# Patient Record
Sex: MALE | Race: WHITE | URBAN - METROPOLITAN AREA
[De-identification: names, ages, dates, MRNs, and addresses within clinical notes are randomized per-mention and may not be internally consistent; named-entity substitution may affect disease eponyms.]

---

## 2020-02-10 ENCOUNTER — OFFICE VISIT (OUTPATIENT)
Dept: HEMATOLOGY | Age: 35
End: 2020-02-10

## 2020-02-10 ENCOUNTER — HOSPITAL ENCOUNTER (OUTPATIENT)
Dept: LAB | Age: 35
Discharge: HOME | End: 2020-02-10
Payer: COMMERCIAL

## 2020-02-10 VITALS
TEMPERATURE: 98.3 F | HEART RATE: 81 BPM | HEIGHT: 70 IN | OXYGEN SATURATION: 98 % | BODY MASS INDEX: 39.6 KG/M2 | DIASTOLIC BLOOD PRESSURE: 74 MMHG | RESPIRATION RATE: 18 BRPM | SYSTOLIC BLOOD PRESSURE: 127 MMHG | WEIGHT: 276.6 LBS

## 2020-02-10 DIAGNOSIS — K76.0 FATTY LIVER: Primary | ICD-10-CM

## 2020-02-10 DIAGNOSIS — K76.0 FATTY LIVER: ICD-10-CM

## 2020-02-10 PROBLEM — I10 HYPERTENSION: Status: ACTIVE | Noted: 2020-02-10

## 2020-02-10 LAB
ALBUMIN SERPL-MCNC: 4.3 G/DL (ref 3.4–5)
ALBUMIN/GLOB SERPL: 1.3 {RATIO} (ref 0.8–1.7)
ALP SERPL-CCNC: 65 U/L (ref 45–117)
ALT SERPL-CCNC: 65 U/L (ref 16–61)
ANION GAP SERPL CALC-SCNC: 6 MMOL/L (ref 3–18)
AST SERPL-CCNC: 20 U/L (ref 10–38)
BASOPHILS # BLD: 0 K/UL (ref 0–0.1)
BASOPHILS NFR BLD: 1 % (ref 0–2)
BILIRUB DIRECT SERPL-MCNC: 0.2 MG/DL (ref 0–0.2)
BILIRUB SERPL-MCNC: 0.7 MG/DL (ref 0.2–1)
BUN SERPL-MCNC: 19 MG/DL (ref 7–18)
BUN/CREAT SERPL: 24 (ref 12–20)
CALCIUM SERPL-MCNC: 9.1 MG/DL (ref 8.5–10.1)
CHLORIDE SERPL-SCNC: 102 MMOL/L (ref 100–111)
CO2 SERPL-SCNC: 28 MMOL/L (ref 21–32)
CREAT SERPL-MCNC: 0.79 MG/DL (ref 0.6–1.3)
DIFFERENTIAL METHOD BLD: ABNORMAL
EOSINOPHIL # BLD: 0.2 K/UL (ref 0–0.4)
EOSINOPHIL NFR BLD: 3 % (ref 0–5)
ERYTHROCYTE [DISTWIDTH] IN BLOOD BY AUTOMATED COUNT: 13.1 % (ref 11.6–14.5)
FERRITIN SERPL-MCNC: 241 NG/ML (ref 8–388)
GLOBULIN SER CALC-MCNC: 3.4 G/DL (ref 2–4)
GLUCOSE SERPL-MCNC: 93 MG/DL (ref 74–99)
HCT VFR BLD AUTO: 45 % (ref 36–48)
HGB BLD-MCNC: 15.1 G/DL (ref 13–16)
INR PPP: 1 (ref 0.8–1.2)
IRON SATN MFR SERPL: 27 % (ref 20–50)
IRON SERPL-MCNC: 92 UG/DL (ref 50–175)
LYMPHOCYTES # BLD: 2.3 K/UL (ref 0.9–3.6)
LYMPHOCYTES NFR BLD: 37 % (ref 21–52)
MCH RBC QN AUTO: 28.9 PG (ref 24–34)
MCHC RBC AUTO-ENTMCNC: 33.6 G/DL (ref 31–37)
MCV RBC AUTO: 86.2 FL (ref 74–97)
MONOCYTES # BLD: 0.5 K/UL (ref 0.05–1.2)
MONOCYTES NFR BLD: 8 % (ref 3–10)
NEUTS SEG # BLD: 3.2 K/UL (ref 1.8–8)
NEUTS SEG NFR BLD: 51 % (ref 40–73)
PLATELET # BLD AUTO: 427 K/UL (ref 135–420)
PMV BLD AUTO: 9.7 FL (ref 9.2–11.8)
POTASSIUM SERPL-SCNC: 4.1 MMOL/L (ref 3.5–5.5)
PROT SERPL-MCNC: 7.7 G/DL (ref 6.4–8.2)
PROTHROMBIN TIME: 13.2 SEC (ref 11.5–15.2)
RBC # BLD AUTO: 5.22 M/UL (ref 4.7–5.5)
SODIUM SERPL-SCNC: 136 MMOL/L (ref 136–145)
TIBC SERPL-MCNC: 347 UG/DL (ref 250–450)
WBC # BLD AUTO: 6.3 K/UL (ref 4.6–13.2)

## 2020-02-10 PROCEDURE — 82728 ASSAY OF FERRITIN: CPT

## 2020-02-10 PROCEDURE — 80076 HEPATIC FUNCTION PANEL: CPT

## 2020-02-10 PROCEDURE — 85610 PROTHROMBIN TIME: CPT

## 2020-02-10 PROCEDURE — 86803 HEPATITIS C AB TEST: CPT

## 2020-02-10 PROCEDURE — 85025 COMPLETE CBC W/AUTO DIFF WBC: CPT

## 2020-02-10 PROCEDURE — 80048 BASIC METABOLIC PNL TOTAL CA: CPT

## 2020-02-10 PROCEDURE — 36415 COLL VENOUS BLD VENIPUNCTURE: CPT

## 2020-02-10 PROCEDURE — 86704 HEP B CORE ANTIBODY TOTAL: CPT

## 2020-02-10 PROCEDURE — 86708 HEPATITIS A ANTIBODY: CPT

## 2020-02-10 PROCEDURE — 83540 ASSAY OF IRON: CPT

## 2020-02-10 PROCEDURE — 86706 HEP B SURFACE ANTIBODY: CPT

## 2020-02-10 PROCEDURE — 82390 ASSAY OF CERULOPLASMIN: CPT

## 2020-02-10 PROCEDURE — 82103 ALPHA-1-ANTITRYPSIN TOTAL: CPT

## 2020-02-10 PROCEDURE — 87340 HEPATITIS B SURFACE AG IA: CPT

## 2020-02-10 NOTE — PROGRESS NOTES
Chief Complaint   Patient presents with    New Patient   Patient states he is on two blood pressure pills. He is unsure of names. Will bring both bottles at next appointment. Visit Vitals  /74 (BP 1 Location: Left arm, BP Patient Position: Sitting)   Pulse 81   Temp 98.3 °F (36.8 °C) (Tympanic)   Resp 18   Ht 5' 10\" (1.778 m)   Wt 276 lb 9.6 oz (125.5 kg)   SpO2 98%   BMI 39.69 kg/m²     3 most recent PHQ Screens 2/10/2020   Little interest or pleasure in doing things Not at all   Feeling down, depressed, irritable, or hopeless Several days   Total Score PHQ 2 1     Abuse Screening Questionnaire 2/10/2020   Do you ever feel afraid of your partner? N   Are you in a relationship with someone who physically or mentally threatens you? N   Is it safe for you to go home?  Y     Learning Assessment 2/10/2020   PRIMARY LEARNER Patient   HIGHEST LEVEL OF EDUCATION - PRIMARY LEARNER  2 YEARS OF COLLEGE   BARRIERS PRIMARY LEARNER NONE   PRIMARY LANGUAGE ENGLISH   LEARNER PREFERENCE PRIMARY LISTENING   ANSWERED BY patient   RELATIONSHIP SELF

## 2020-02-10 NOTE — Clinical Note
2/29/20 Patient: Melanie Wade YOB: 1985 Date of Visit: 2/10/2020 Emile Weller MD 
[facility] [address] [facility] [address] [address] VIA Facsimile: [phone] Dear Emile Weller MD, Thank you for referring Mr. Melanie Wade to [address] [place] [address],[address] for evaluation. My notes for this consultation are attached. If you have questions, please do not hesitate to call me. I look forward to following your patient along with you. Sincerely, Jason Bashir MD

## 2020-02-10 NOTE — PROGRESS NOTES
[facility] hospitals, Opal NAIDU Woodfin Grumbling, MD Lauretha Head, MYLA Martínez Officer, [facility]     Jill Mohan, [facility]   Sara Figueroa, YISEL Camargo, [facility]       Mimi Sy Jairo De [address]    at [address], [address] Cec[place] Lyn  22.    [phone]    FAX: [address] [place] [address] Avenue    [address], [address]    [phone]    FAX: [phone]       Patient Care Team:  Wilberto Gonzalez MD as PCP - General (Internal Medicine)      Problem List  Date Reviewed: 2/10/2020          Codes Class Noted    Fatty liver ICD-10-CM: K76.0  ICD-9-CM: 571.8  2/10/2020        Hypertension ICD-10-CM: I10  ICD-9-CM: 401.9  2/10/2020              The clinicians listed above have asked me to see Kaden Gonzáles in consultation regarding elevated liver enzymes and its management. All medical records sent by the referring physicians were reviewed including imaging studies     The patient is a 29 y.o.  male who was found to have elevated liver enzymes in 2013. The most recent imaging of the liver was Ultrasound performed in 11/2019. Results suggest fatty liver disease. Serologic evaluation for markers of chronic liver disease has either not been performed or the results are not available. An assessment of liver fibrosis with biopsy or elastography has not been performed. The patient has no symptoms which can be attributed to the liver disorder. The patient is not currently experiencing the following symptoms of liver disease:  fatigue, pain in the right side over the liver,     The patient completes all daily activities without any functional limitations.       ASSESSMENT AND PLAN:  Elevated liver enzymes  AST is normal.  ALT is elevated. ALP is normal.  Liver function is normal.  The platelet count is normal.      Based upon laboratory studies and imaging the patient does not appear to have advanced liver disease. Serologic testing for causes of chronic liver disease were ordered. All were negative     The most likely causes for the liver chemistry abnormalities were discussed with the patient and include fatty liver disease,     The need to perform an assessment of liver fibrosis was discussed with the patient. The Fibroscan can assess liver fibrosis and determine if a patient has advanced fibrosis or cirrhosis without the need for liver biopsy. This will be performed at the next office visit. If the Fibroscan suggests advanced fibrosis then a liver biopsy should be considered. The Fibroscan can be repeated annually or as often as clinically indicated to assess for fibrosis progression and/or regression. Screening for Hepatocellular Carcinoma  HCC screening is not necessary if the patient has no evidence of cirrhosis. Treatment of other medical problems in patients with chronic liver disease  There are no contraindications for the patient to take most medications that are necessary for treatment of other medical issues. Counseling for alcohol in patients with chronic liver disease  The patient was counseled regarding alcohol consumption and the effect of alcohol on chronic liver disease. The patient does not consume any significant amount of alcohol. Vaccinations   Vaccination for viral hepatitis A and B is recommended since the patient has no serologic evidence of previous exposure or vaccination with immunity. Routine vaccinations against other bacterial and viral agents can be performed as indicated. Annual flu vaccination should be administered if indicated.       ALLERGIES  Allergies   Allergen Reactions    Codeine Rash     childhood       MEDICATIONS  No current outpatient medications on file. No current facility-administered medications for this visit. SYSTEM REVIEW NOT RELATED TO LIVER DISEASE OR REVIEWED ABOVE:  Constitution systems: Negative for fever, chills, weight gain, weight loss. Eyes: Negative for visual changes. ENT: Negative for sore throat, painful swallowing. Respiratory: Negative for cough, hemoptysis, SOB. Cardiology: Negative for chest pain, palpitations. GI:  Negative for constipation or diarrhea. : Negative for urinary frequency, dysuria, hematuria, nocturia. Skin: Negative for rash. Hematology: Negative for easy bruising, blood clots. Musculo-skelatal: Negative for back pain, muscle pain, weakness. Neurologic: Negative for headaches, dizziness, vertigo, memory problems not related to HE. Psychology: Negative for anxiety, depression. FAMILY HISTORY:  The father Has/had the following following chronic disease(s): None. The mother  of cancer. There is no family history of liver disease. SOCIAL HISTORY:  The patient has never been . The patient has no children. The patient has never used tobacco products. The patient has never consumed significant amounts of alcohol. The patient currently works full time in  [employer]. PHYSICAL EXAMINATION:  Visit Vitals  /74 (BP 1 Location: Left arm, BP Patient Position: Sitting)   Pulse 81   Temp 98.3 °F (36.8 °C) (Tympanic)   Resp 18   Ht 5' 10\" (1.778 m)   Wt 276 lb 9.6 oz (125.5 kg)   SpO2 98%   BMI 39.69 kg/m²     General: No acute distress. Eyes: Sclera anicteric. ENT: No oral lesions. Thyroid normal.  Nodes: No adenopathy. Skin: No spider angiomata. No jaundice. No palmar erythema. Respiratory: Lungs clear to auscultation. Cardiovascular: Regular heart rate. No murmurs. No JVD. Abdomen: Soft non-tender. Liver size normal to percussion/palpation. Spleen not palpable. No obvious ascites. Extremities: No edema.   No muscle wasting. No gross arthritic changes. Neurologic: Alert and oriented. Cranial nerves grossly intact. No asterixis. LABORATORY STUDIES:  Liver [place] of [address] [place] [address] Units 2/10/2020   WBC 4.6 - 13.2 K/uL 6.3   ANC 1.8 - 8.0 K/UL 3.2   HGB 13.0 - 16.0 g/dL 15.1    - 420 K/uL 427 (H)   INR 0.8 - 1.2   1.0   AST 10 - 38 U/L 20   ALT 16 - 61 U/L 65 (H)   Alk Phos 45 - 117 U/L 65   Bili, Total 0.2 - 1.0 MG/DL 0.7   Bili, Direct 0.0 - 0.2 MG/DL 0.2   Albumin 3.4 - 5.0 g/dL 4.3   BUN 7.0 - 18 MG/DL 19 (H)   Creat 0.6 - 1.3 MG/DL 0.79   Na 136 - 145 mmol/L 136   K 3.5 - 5.5 mmol/L 4.1   Cl 100 - 111 mmol/L 102   CO2 21 - 32 mmol/L 28   Glucose 74 - 99 mg/dL 93     SEROLOGIES:  Serologies Latest Ref Rng & Units 2/10/2020   Hep A Ab, Total NEGATIVE   NEGATIVE   Hep B Surface Ag <1.00 Index <0.10   Hep B Surface Ag Interp NEG   NEGATIVE   Hep B Core Ab, Total NEGATIVE   NEGATIVE   Hep B Surface Ab >10.0 mIU/mL <3.10 (L)   Hep B Surface Ab Interp POS   NEGATIVE (A)   Hep C Ab 0.0 - 0.9 s/co ratio 0.3   Ferritin 8 - 388 NG/   Iron % Saturation 20 - 50 % 27   Ceruloplasmin 16.0 - 31.0 mg/dL 28.3   Alpha-1 antitrypsin level 95 - 164 mg/dL 122     LIVER HISTOLOGY:  Not available or performed    ENDOSCOPIC PROCEDURES:  Not available or performed    RADIOLOGY:  11/2019. Ultrasound of liver. Echogenic consistent with fatty liver. No liver mass lesions. No dilated bile ducts. No ascites. OTHER TESTING:  Not available or performed    FOLLOW-UP:  All of the issues listed above in the Assessment and Plan were discussed with the patient. All questions were answered. The patient expressed a clear understanding of the above. [address] [place] [address] in 4 weeks for Fibroscan to review all data and determine the treatment plan.       Federico Thapa MD  [address] [facility]  [address] [place] [address] [address] [facility]

## 2020-02-11 LAB
A1AT SERPL-MCNC: 122 MG/DL (ref 95–164)
CERULOPLASMIN SERPL-MCNC: 28.3 MG/DL (ref 16–31)
COMMENT, 144067: NORMAL
HAV AB SER QL IA: NEGATIVE
HBV CORE AB SERPL QL IA: NEGATIVE
HBV SURFACE AB SER QL IA: NEGATIVE
HBV SURFACE AB SERPL IA-ACNC: <3.1 MIU/ML
HBV SURFACE AG SER QL: <0.1 INDEX
HBV SURFACE AG SER QL: NEGATIVE
HCV AB S/CO SERPL IA: 0.3 S/CO RATIO (ref 0–0.9)
HEP BS AB COMMENT,HBSAC: ABNORMAL

## 2020-06-24 ENCOUNTER — HOSPITAL ENCOUNTER (OUTPATIENT)
Dept: LAB | Age: 35
Discharge: HOME | End: 2020-06-24
Payer: COMMERCIAL

## 2020-06-24 ENCOUNTER — OFFICE VISIT (OUTPATIENT)
Dept: HEMATOLOGY | Age: 35
End: 2020-06-24

## 2020-06-24 VITALS
HEIGHT: 70 IN | TEMPERATURE: 97.3 F | BODY MASS INDEX: 37.85 KG/M2 | DIASTOLIC BLOOD PRESSURE: 78 MMHG | HEART RATE: 60 BPM | WEIGHT: 264.38 LBS | OXYGEN SATURATION: 98 % | SYSTOLIC BLOOD PRESSURE: 127 MMHG

## 2020-06-24 DIAGNOSIS — E66.01 SEVERE OBESITY (HCC): ICD-10-CM

## 2020-06-24 DIAGNOSIS — K76.0 FATTY LIVER: ICD-10-CM

## 2020-06-24 DIAGNOSIS — K76.0 FATTY LIVER: Primary | ICD-10-CM

## 2020-06-24 LAB
ALBUMIN SERPL-MCNC: 4.5 G/DL (ref 3.4–5)
ALBUMIN/GLOB SERPL: 1.4 {RATIO} (ref 0.8–1.7)
ALP SERPL-CCNC: 60 U/L (ref 45–117)
ALT SERPL-CCNC: 85 U/L (ref 16–61)
ANION GAP SERPL CALC-SCNC: 4 MMOL/L (ref 3–18)
AST SERPL-CCNC: 24 U/L (ref 10–38)
BASOPHILS # BLD: 0 K/UL (ref 0–0.1)
BASOPHILS NFR BLD: 1 % (ref 0–2)
BILIRUB DIRECT SERPL-MCNC: 0.2 MG/DL (ref 0–0.2)
BILIRUB SERPL-MCNC: 1.3 MG/DL (ref 0.2–1)
BUN SERPL-MCNC: 22 MG/DL (ref 7–18)
BUN/CREAT SERPL: 27 (ref 12–20)
CALCIUM SERPL-MCNC: 9.3 MG/DL (ref 8.5–10.1)
CHLORIDE SERPL-SCNC: 104 MMOL/L (ref 100–111)
CO2 SERPL-SCNC: 28 MMOL/L (ref 21–32)
CREAT SERPL-MCNC: 0.82 MG/DL (ref 0.6–1.3)
DIFFERENTIAL METHOD BLD: NORMAL
EOSINOPHIL # BLD: 0.1 K/UL (ref 0–0.4)
EOSINOPHIL NFR BLD: 3 % (ref 0–5)
ERYTHROCYTE [DISTWIDTH] IN BLOOD BY AUTOMATED COUNT: 13.6 % (ref 11.6–14.5)
GLOBULIN SER CALC-MCNC: 3.3 G/DL (ref 2–4)
GLUCOSE SERPL-MCNC: 93 MG/DL (ref 74–99)
HCT VFR BLD AUTO: 45 % (ref 36–48)
HGB BLD-MCNC: 14.2 G/DL (ref 13–16)
LYMPHOCYTES # BLD: 1.7 K/UL (ref 0.9–3.6)
LYMPHOCYTES NFR BLD: 37 % (ref 21–52)
MCH RBC QN AUTO: 29 PG (ref 24–34)
MCHC RBC AUTO-ENTMCNC: 31.6 G/DL (ref 31–37)
MCV RBC AUTO: 91.8 FL (ref 74–97)
MONOCYTES # BLD: 0.5 K/UL (ref 0.05–1.2)
MONOCYTES NFR BLD: 9 % (ref 3–10)
NEUTS SEG # BLD: 2.4 K/UL (ref 1.8–8)
NEUTS SEG NFR BLD: 50 % (ref 40–73)
PLATELET # BLD AUTO: 402 K/UL (ref 135–420)
PMV BLD AUTO: 9.9 FL (ref 9.2–11.8)
POTASSIUM SERPL-SCNC: 4.6 MMOL/L (ref 3.5–5.5)
PROT SERPL-MCNC: 7.8 G/DL (ref 6.4–8.2)
RBC # BLD AUTO: 4.9 M/UL (ref 4.7–5.5)
SODIUM SERPL-SCNC: 136 MMOL/L (ref 136–145)
WBC # BLD AUTO: 4.8 K/UL (ref 4.6–13.2)

## 2020-06-24 PROCEDURE — 80048 BASIC METABOLIC PNL TOTAL CA: CPT

## 2020-06-24 PROCEDURE — 80076 HEPATIC FUNCTION PANEL: CPT

## 2020-06-24 PROCEDURE — 85025 COMPLETE CBC W/AUTO DIFF WBC: CPT

## 2020-06-24 PROCEDURE — 36415 COLL VENOUS BLD VENIPUNCTURE: CPT

## 2020-06-24 RX ORDER — AMLODIPINE BESYLATE 10 MG/1
10 TABLET ORAL DAILY
COMMUNITY

## 2020-06-24 RX ORDER — LISINOPRIL 40 MG/1
40 TABLET ORAL DAILY
COMMUNITY

## 2020-06-24 NOTE — PROGRESS NOTES
[address] [facility], Jason NAIDU Grayce Barcelona, MD Coleridge Raid, PA-C June Nevin [facility]     April S Kimberlee [facility]   YISEL Aj [facility]       Mimi Sy [facility] 136    at [place] [address] [address] [facility] [address], [address] [facility], Cecile  22.    [phone]    FAX: [address] [place] [address], [address], [address]    [phone]    FAX: [phone]       Patient Care Team:  Stanley Hazel MD as PCP - General (Internal Medicine)      Problem List  Date Reviewed: 2/29/2020          Codes Class Noted    Severe obesity [facility]) ICD-10-CM: E66.01  ICD-9-CM: 278.01  6/24/2020        Fatty liver ICD-10-CM: K76.0  ICD-9-CM: 571.8  2/10/2020        Hypertension ICD-10-CM: I10  ICD-9-CM: 401.9  2/10/2020              Yobany Rodriguez returns to the [place] [address] today for fibroscan assessment of hepatic fibrosis and for education and management of fatty liver disease. The active problem list, all pertinent past medical history, medications, liver histology, endoscopic studies, radiologic findings and laboratory findings related to the liver disorder were reviewed with the patient. The patient is a 29 y.o.  male who was found to have elevated liver enzymes in 2013. The most recent imaging of the liver was Ultrasound performed in 11/2019. Results suggest fatty liver disease. Serologic evaluation was negative for all causes of chronic liver disease. An assessment of liver fibrosis with fibroscan was performed in the office today and suggests no hepatic fibrosis with a Metavir fibrosis score of F0. Scan also suggests fatty liver.        The patient has no symptoms which can be attributed to the liver disorder. The patient completes all daily activities without any functional limitations. The patient has not experienced fatigue, fevers, chills, shortness of breath, chest pain, pain in the right side over the liver, diffuse abdominal pain, nausea, vomiting, constipation, diarrhrea, dry eyes, dry mouth, arthralgias, myalgias, yellowing of the eyes or skin, itching, dark urine, problems concentrating, swelling of the abdomen, swelling of the lower extremities, hematemesis, or hematochezia. ASSESSMENT AND PLAN:  Elevated liver enzymes  AST is normal.  ALT is elevated. ALP is normal.  Liver function is normal.  The platelet count is normal.      Based upon laboratory studies, imaging, and fibroscan analysis, the patient does not appear to have advanced liver disease. Serologic evaluation was negative for all causes of chronic liver disease. The most likely causes for the liver chemistry abnormalities were discussed with the patient and include fatty liver disease. Fatty liver  Benign steatosis/NAFL  The diagnosis is based upon imaging, Fiboscan CAP score, and serologic studies that are negative for other causes of chronic liver disease. Fibroscan performed in today suggests no fibrosis. The Fibroscan can be repeated annually or as often as clinically indicated to assess for fibrosis progression and/or regression. NAFL is a benign form of fatty liver disease and not thought to progress to fibrosis or cirrhosis. Will perform laboratory testing to monitor liver function and degree of liver injury. This will include hepatic panel, a CBC w/ diff, and a  BMP. Only a liver biopsy can confirm if the patient does have fatty liver and differentiate NAFL from PAREDES. The treatment is the same; weight reduction. There is no reason to perform liver biopsy at this time. Liver chemistries will be monitored.       If the liver enzymes remain persistently elevated over the next 1-2 years a liver biopsy should be performed to ensure there is no ongoing chronic liver disease. There is currently no FDA approved medical treatment for fatty liver, NALFD or PAREDES. The only medical treatments for PAREDES are though clinical trials. The patient has declined to participate in a clinical trial and would like to try to lose weight through changes in diet and life style. Counseling for diet and weight loss in patients with confirmed or suspected NAFLD  The patient was counseled regarding diet and exercise to achieve weight loss. The best diet for patients with fatty liver is one very low in carbohydrates and enriched with protein such as an Fatoumata's program.      The patient was told not to consume any food products and drinks containing fructose as this enhances hepatic fat synthesis. There is no medication or vitamin supplements that we advocate for PAREDES. Using glitazones in patients without diabetes mellitus has been shown to reduce fat content in the liver but has no effect on fibrosis and is associated with weight gain. Vitamin E has also been used but the data is not very good and most experts no longer advocate this. Screening for Hepatocellular Carcinoma  HCC screening is not necessary if the patient has no evidence of cirrhosis. Treatment of other medical problems in patients with chronic liver disease  There are no contraindications for the patient to take most medications that are necessary for treatment of other medical issues. Counseling for alcohol in patients with chronic liver disease  The patient was counseled regarding alcohol consumption and the effect of alcohol on chronic liver disease. The patient does not consume any significant amount of alcohol. Vaccinations   Vaccination for viral hepatitis A and B is recommended since the patient has no serologic evidence of previous exposure or vaccination with immunity.   Routine vaccinations against other bacterial and viral agents can be performed as indicated. Annual flu vaccination should be administered if indicated. ALLERGIES  Allergies   Allergen Reactions    Codeine Rash     childhood       MEDICATIONS  No current outpatient medications on file. No current facility-administered medications for this visit. SYSTEM REVIEW NOT RELATED TO LIVER DISEASE OR REVIEWED ABOVE:  Constitution systems: Negative for fever, chills, weight gain, weight loss. Eyes: Negative for visual changes. ENT: Negative for sore throat, painful swallowing. Respiratory: Negative for cough, hemoptysis, SOB. Cardiology: Negative for chest pain, palpitations. GI:  Negative for constipation or diarrhea. : Negative for urinary frequency, dysuria, hematuria, nocturia. Skin: Negative for rash. Hematology: Negative for easy bruising, blood clots. Musculo-skelatal: Negative for back pain, muscle pain, weakness. Neurologic: Negative for headaches, dizziness, vertigo, memory problems not related to HE. Psychology: Negative for anxiety, depression. FAMILY HISTORY:  The father Has/had the following following chronic disease(s): None. The mother  of cancer. There is no family history of liver disease. SOCIAL HISTORY:  The patient has never been . The patient has no children. The patient has never used tobacco products. The patient has never consumed significant amounts of alcohol. The patient currently works full time in  [employer]. PHYSICAL EXAMINATION:  Visit Vitals  /78   Pulse 60   Temp 97.3 °F (36.3 °C) (Tympanic)   Ht 5' 10\" (1.778 m)   Wt 264 lb 6 oz (119.9 kg)   SpO2 98%   BMI 37.93 kg/m²     General: No acute distress. Eyes: Sclera anicteric. ENT: No oral lesions. Thyroid normal.  Nodes: No adenopathy. Skin: No spider angiomata. No jaundice. No palmar erythema. Respiratory: Lungs clear to auscultation. Cardiovascular: Regular heart rate. No murmurs.   No JVD.  Abdomen: Soft non-tender. Liver size normal to percussion/palpation. Spleen not palpable. No obvious ascites. Extremities: No edema. No muscle wasting. No gross arthritic changes. Neurologic: Alert and oriented. Cranial nerves grossly intact. No asterixis. LABORATORY STUDIES:  Liver [place] [address] & Units 2/10/2020   WBC 4.6 - 13.2 K/uL 6.3   ANC 1.8 - 8.0 K/UL 3.2   HGB 13.0 - 16.0 g/dL 15.1    - 420 K/uL 427 (H)   INR 0.8 - 1.2   1.0   AST 10 - 38 U/L 20   ALT 16 - 61 U/L 65 (H)   Alk Phos 45 - 117 U/L 65   Bili, Total 0.2 - 1.0 MG/DL 0.7   Bili, Direct 0.0 - 0.2 MG/DL 0.2   Albumin 3.4 - 5.0 g/dL 4.3   BUN 7.0 - 18 MG/DL 19 (H)   Creat 0.6 - 1.3 MG/DL 0.79   Na 136 - 145 mmol/L 136   K 3.5 - 5.5 mmol/L 4.1   Cl 100 - 111 mmol/L 102   CO2 21 - 32 mmol/L 28   Glucose 74 - 99 mg/dL 93     SEROLOGIES:  Serologies Latest Ref Rng & Units 2/10/2020   Hep A Ab, Total NEGATIVE   NEGATIVE   Hep B Surface Ag <1.00 Index <0.10   Hep B Surface Ag Interp NEG   NEGATIVE   Hep B Core Ab, Total NEGATIVE   NEGATIVE   Hep B Surface Ab >10.0 mIU/mL <3.10 (L)   Hep B Surface Ab Interp POS   NEGATIVE (A)   Hep C Ab 0.0 - 0.9 s/co ratio 0.3   Ferritin 8 - 388 NG/   Iron % Saturation 20 - 50 % 27   Ceruloplasmin 16.0 - 31.0 mg/dL 28.3   Alpha-1 antitrypsin level 95 - 164 mg/dL 122     LIVER HISTOLOGY:  06/2020. FibroScan performed at [address]. EkPa was 4.1. IQR/med 6%. . The results suggested a fibrosis level of F0. The CAP score suggests fatty liver. ENDOSCOPIC PROCEDURES:  Not available or performed    RADIOLOGY:  11/2019. Ultrasound of liver. Echogenic consistent with fatty liver. No liver mass lesions. No dilated bile ducts. No ascites. OTHER TESTING:  Not available or performed    FOLLOW-UP:  All of the issues listed above in the Assessment and Plan were discussed with the patient. All questions were answered.   The patient expressed a clear understanding of the above. [address] in 6 months with a weight loss goal of 12 pounds.      LAURY Ernst-C  Liver [place] of [address] [place] [address],[address]  [address] [place] [address], [address] [place] [address] Brooke Sharp, [address] [place] [address]   [phone]

## 2020-12-28 ENCOUNTER — VIRTUAL VISIT (OUTPATIENT)
Dept: HEMATOLOGY | Age: 35
End: 2020-12-28
Payer: COMMERCIAL

## 2020-12-28 DIAGNOSIS — K76.0 FATTY LIVER: Primary | ICD-10-CM

## 2020-12-28 PROCEDURE — 99442 PR PHYS/QHP TELEPHONE EVALUATION 11-20 MIN: CPT | Performed by: NURSE PRACTITIONER

## 2020-12-28 NOTE — PROGRESS NOTES
Vivian Jenkins is a 28 y.o. male, evaluated via audio-only technology on 12/28/2020 for fatty liver disease. We attempted to have a virtualvisit via smart phone, but the connection was so bad that we had to change the visit to phone only. .    Assessment & Plan:   The most likely causes for the liver chemistry abnormalities were discussed with the patient and include fatty liver disease. Fatty liver  Benign steatosis/NAFL  The diagnosis is based upon imaging, Fiboscan CAP score, and serologic studies that are negative for other causes of chronic liver disease.     Fibroscan performed in today suggests no fibrosis. The Fibroscan can be repeated annually or as often as clinically indicated to assess for fibrosis progression and/or regression.      NAFL is a benign form of fatty liver disease and not thought to progress to fibrosis or cirrhosis. Counseling for diet and weight loss in patients with confirmed or suspected NAFLD  The patient was counseled regarding diet and exercise to achieve weight loss. The best diet for patients with fatty liver is one very low in carbohydrates and enriched with protein such as an Fatoumata's program.       The patient was told not to consume any food products and drinks containing fructose as this enhances hepatic fat synthesis. Patient weight loss gaol in 1 to pounds per months. The need to perform an assessment of liver fibrosis was discussed with the patient. The Fibroscan can assess liver fibrosis and determine if a patient has advanced fibrosis or cirrhosis without the need for liver biopsy. This will be performed at the next office visit. The Fibroscan can be repeated annually or as often as clinically indicated to assess for fibrosis progression and/or regression. 12  Subjective: The patient completes all daily activities without any functional limitations.  The patient has not experienced fatigue, fevers, chills, shortness of breath, chest pain, pain in the right side over the liver, diffuse abdominal pain, nausea, vomiting, constipation, diarrhrea, dry eyes, dry mouth, arthralgias, myalgias, yellowing of the eyes or skin, itching, dark urine, problems concentrating, swelling of the abdomen, swelling of the lower extremities, hematemesis, or hematochezia. Prior to Admission medications    Medication Sig Start Date End Date Taking? Authorizing Provider   lisinopriL (PRINIVIL, ZESTRIL) 40 mg tablet Take 40 mg by mouth daily. Provider, Historical   amLODIPine (NORVASC) 10 mg tablet Take 10 mg by mouth daily. Provider, Historical     ROS   Constitution systems: Negative for fever, chills, weight gain, weight loss. Eyes: Negative for visual changes. ENT: Negative for sore throat, painful swallowing. Respiratory: Negative for cough, hemoptysis, SOB. Cardiology: Negative for chest pain, palpitations. GI:  Negative for constipation or diarrhea. : Negative for urinary frequency, dysuria, hematuria, nocturia. Skin: Negative for rash. Hematology: Negative for easy bruising, blood clots. Musculo-skelatal: Negative for back pain, muscle pain, weakness. Neurologic: Negative for headaches, dizziness, vertigo, memory problems not related to HE. Psychology: Negative for anxiety, depression. No flowsheet data found. Shanon Recio, who was evaluated through a patient-initiated, synchronous (real-time) audio only encounter, and/or her healthcare decision maker, is aware that it is a billable service, with coverage as determined by his insurance carrier. He provided verbal consent to proceed: Yes. He has not had a related appointment within my department in the past 7 days or scheduled within the next 24 hours.       Total Time: minutes: 11-20 minutes    Gildardo Crocker NP

## 2021-06-28 ENCOUNTER — VIRTUAL VISIT (OUTPATIENT)
Dept: HEMATOLOGY | Age: 36
End: 2021-06-28
Payer: COMMERCIAL

## 2021-06-28 DIAGNOSIS — K76.0 FATTY LIVER: Primary | ICD-10-CM

## 2021-06-28 PROCEDURE — 99213 OFFICE O/P EST LOW 20 MIN: CPT | Performed by: NURSE PRACTITIONER

## 2021-06-28 NOTE — PROGRESS NOTES
Yesy Aguilar MD, Ceferino Howard, MD Judith Corey, MYLA [place], [facility]     Jill Mohan, [facility]   LAURY Ponce-DEBRA Olivera, [facility]       Mimi Sy J[address]    at [address], [address] [place] [address] 22.    [phone]    FAX: [address] [place] [address], [address]    [phone]    FAX: [phone]       Patient Care Team:  Marquis Parry MD as PCP - General (Internal Medicine)      Problem List  Date Reviewed: 6/24/2020        Codes Class Noted    Severe obesity ([facility] [address] ICD-10-CM: E66.01  ICD-9-CM: 278.01  6/24/2020        Fatty liver ICD-10-CM: K76.0  ICD-9-CM: 571.8  2/10/2020        Hypertension ICD-10-CM: I10  ICD-9-CM: 401.9  2/10/2020              VIRTUAL TELEHEALTH VISIT PERFORMED DUE TO COVID-19 EPIDEMIC    CONSENT:  Gabriel Ugarte, who was seen by synchronous, real-time, audio-video technology, and/or his healthcare decision maker, is aware that this patient-initiated, Telehealth encounter on 6/28/2021 is a billable service, with coverage as determined by his insurance carrier. He is aware that he may receive a bill and has provided verbal consent to proceed. This patient was evaluated during a Virtual Telehealth visit. A caregiver was present if appropriate. Due to this being a TeleHealth encounter performed during the FUZXF-91 public health emergency, the physical examination was limited to that listed in the [facility] [place] was seen virtually today for education and management of fatty liver disease.     The active problem list, all pertinent past medical history, medications, liver histology, endoscopic studies, radiologic findings and laboratory findings related to the liver disorder were reviewed with the patient. The patient is a 28 y.o.  male who was found to have elevated liver enzymes in 2013. The most recent imaging of the liver was Ultrasound performed in 11/2019. Results suggest fatty liver disease. Serologic evaluation was negative for all causes of chronic liver disease. An assessment of liver fibrosis with fibroscan was performed in the office in 06/2020. Results of the scan suggest no hepatic fibrosis with a Metavir fibrosis score of F0. Scan also suggests fatty liver. The patient has no symptoms which can be attributed to the liver disorder. The patient completes all daily activities without any functional limitations. The patient has not experienced fatigue, fevers, chills, shortness of breath, chest pain, pain in the right side over the liver, diffuse abdominal pain, nausea, vomiting, constipation, diarrhrea, dry eyes, dry mouth, arthralgias, myalgias, yellowing of the eyes or skin, itching, dark urine, problems concentrating, swelling of the abdomen, swelling of the lower extremities, hematemesis, or hematochezia. Since the last office appointment:  Was diagnosed with COVID-19 today. No weight loss. ASSESSMENT AND PLAN:  Elevated liver enzymes  AST is normal.  ALT is elevated. ALP is normal.  Liver function is normal.  The platelet count is normal.      Based upon laboratory studies, imaging, and fibroscan analysis, the patient does not appear to have advanced liver disease. Serologic evaluation was negative for all causes of chronic liver disease. The most likely causes for the liver chemistry abnormalities were discussed with the patient and include fatty liver disease. Fatty liver  Benign steatosis/NAFL  The diagnosis is based upon imaging, Fiboscan CAP score, and serologic studies that are negative for other causes of chronic liver disease.     Fibroscan performed in 06/2020 suggests no fibrosis. The Fibroscan can be repeated annually or as often as clinically indicated to assess for fibrosis progression and/or regression. Will repeat the fibroscan when he comes in for his next appointment in 6 months. NAFL is a benign form of fatty liver disease and not thought to progress to fibrosis or cirrhosis. Will perform laboratory testing to monitor liver function and degree of liver injury. This will include hepatic panel, a CBC w/ diff, and a  BMP. Only a liver biopsy can confirm if the patient does have fatty liver and differentiate NAFL from PAREDES. The treatment is the same; weight reduction. There is no reason to perform liver biopsy at this time. Liver chemistries will be monitored. If the liver enzymes remain persistently elevated over the next 1-2 years a liver biopsy should be performed to ensure there is no ongoing chronic liver disease. There is currently no FDA approved medical treatment for fatty liver, NALFD or PAREDES. The only medical treatments for PAREDES are though clinical trials. The patient has declined to participate in a clinical trial and would like to try to lose weight through changes in diet and life style. Counseling for diet and weight loss in patients with confirmed or suspected NAFLD  The patient was counseled regarding diet and exercise to achieve weight loss. The best diet for patients with fatty liver is one very low in carbohydrates and enriched with protein such as an Fatoumata's program.      The patient was told not to consume any food products and drinks containing fructose as this enhances hepatic fat synthesis. There is no medication or vitamin supplements that we advocate for PAREDES. Using glitazones in patients without diabetes mellitus has been shown to reduce fat content in the liver but has no effect on fibrosis and is associated with weight gain.   Vitamin E has also been used but the data is not very good and most experts no longer advocate this. Screening for Hepatocellular Carcinoma  HCC screening is not necessary if the patient has no evidence of cirrhosis. Treatment of other medical problems in patients with chronic liver disease  There are no contraindications for the patient to take most medications that are necessary for treatment of other medical issues. Counseling for alcohol in patients with chronic liver disease  The patient was counseled regarding alcohol consumption and the effect of alcohol on chronic liver disease. The patient does not consume any significant amount of alcohol. Vaccinations   Vaccination for viral hepatitis A and B is recommended since the patient has no serologic evidence of previous exposure or vaccination with immunity. Routine vaccinations against other bacterial and viral agents can be performed as indicated. Annual flu vaccination should be administered if indicated. ALLERGIES  Allergies   Allergen Reactions    Codeine Rash     childhood       MEDICATIONS  Current Outpatient Medications   Medication Sig    lisinopriL (PRINIVIL, ZESTRIL) 40 mg tablet Take 40 mg by mouth daily.  amLODIPine (NORVASC) 10 mg tablet Take 10 mg by mouth daily. No current facility-administered medications for this visit. SYSTEM REVIEW NOT RELATED TO LIVER DISEASE OR REVIEWED ABOVE:  Constitution systems: Negative for fever, chills, weight gain, weight loss. Eyes: Negative for visual changes. ENT: Negative for sore throat, painful swallowing. Respiratory: Negative for cough, hemoptysis, SOB. Cardiology: Negative for chest pain, palpitations. GI:  Negative for constipation or diarrhea. : Negative for urinary frequency, dysuria, hematuria, nocturia. Skin: Negative for rash. Hematology: Negative for easy bruising, blood clots. Musculo-skelatal: Negative for back pain, muscle pain, weakness.   Neurologic: Negative for headaches, dizziness, vertigo, memory problems not related to HE. Psychology: Negative for anxiety, depression. FAMILY HISTORY:  The father Has/had the following following chronic disease(s): None. The mother  of cancer. There is no family history of liver disease. SOCIAL HISTORY:  The patient has never been . The patient has no children. The patient has never used tobacco products. The patient has never consumed significant amounts of alcohol. The patient currently works full time in  [employer]. PHYSICAL EXAMINATION PERFORMED BY VIRTUAL TELEHEALTH:  VS: Not performed   General: No acute distress. Eyes: Sclera anicteric. ENT: No oral lesions. Skin: No rashes. spider angiomata. No jaundice. Abdomen: No obvious distention suggesting ascites. Extremities: No edema. No muscle wasting. Neurologic: Alert and oriented. Cranial nerves grossly intact. LABORATORY STUDIES:  From 2021 [facility])  AST/ ALT/ ALP/ T. BILI/ ALB:  23/ 54/ 54/ 0.9/ 4.5  BUN/ CRT/ PLT: 15/ 0.87/ 374    Liver [place] of [address] [place] [address] Ref Rng & Units 2020 2/10/2020   WBC 4.6 - 13.2 K/uL 4.8 6.3   ANC 1.8 - 8.0 K/UL 2.4 3.2   HGB 13.0 - 16.0 g/dL 14.2 15.1    - 420 K/uL 402 427 (H)   INR 0.8 - 1.2    1.0   AST 10 - 38 U/L 24 20   ALT 16 - 61 U/L 85 (H) 65 (H)   Alk Phos 45 - 117 U/L 60 65   Bili, Total 0.2 - 1.0 MG/DL 1.3 (H) 0.7   Bili, Direct 0.0 - 0.2 MG/DL 0.2 0.2   Albumin 3.4 - 5.0 g/dL 4.5 4.3   BUN 7.0 - 18 MG/DL 22 (H) 19 (H)   Creat 0.6 - 1.3 MG/DL 0.82 0.79   Na 136 - 145 mmol/L 136 136   K 3.5 - 5.5 mmol/L 4.6 4.1   Cl 100 - 111 mmol/L 104 102   CO2 21 - 32 mmol/L 28 28   Glucose 74 - 99 mg/dL 93 93     SEROLOGIES:  Serologies Latest Ref Rng & Units 2/10/2020   Hep A Ab, Total NEGATIVE   NEGATIVE   Hep B Surface Ag <1.00 Index <0.10   Hep B Surface Ag Interp NEG   NEGATIVE   Hep B Core Ab, Total NEGATIVE   NEGATIVE   Hep B Surface Ab >10.0 mIU/mL <3.10 (L)   Hep B Surface Ab Interp POS   NEGATIVE (A) Hep C Ab 0.0 - 0.9 s/co ratio 0.3   Ferritin 8 - 388 NG/   Iron % Saturation 20 - 50 % 27   Ceruloplasmin 16.0 - 31.0 mg/dL 28.3   Alpha-1 antitrypsin level 95 - 164 mg/dL 122     LIVER HISTOLOGY:  06/2020. FibroScan performed at [facility]. EkPa was 4.1. IQR/med 6%. . The results suggested a fibrosis level of F0. The CAP score suggests fatty liver. ENDOSCOPIC PROCEDURES:  Not available or performed    RADIOLOGY:  11/2019. Ultrasound of liver. Echogenic consistent with fatty liver. No liver mass lesions. No dilated bile ducts. No ascites. OTHER TESTING:  Not available or performed    FOLLOW-UP AFTER VIRTUAL VISIT:  Pursuant to the emergency declaration under the [address] [place] [address], [facility] waiver authority and the [employer] Act, this Virtual  Visit was conducted, with the patient's (and/or their legal guardian's) consent, to reduce the patient's risk of exposure to COVID-19 and provide necessary medical care. Services were provided through a video synchronous discussion virtually to substitute for an in-person clinic visit. The patient was located in their home. The provider was located in the [facility] office. All of the issues listed above in the Assessment and Plan were discussed with the patient. All questions were answered. The patient expressed a clear understanding of the above. An in-person follow-up visit will be scheduled at [place] [id number] in 6 months for Fibroscan   for elastography with a weight loss goal of 12 pounds.        YISEL Forde  Liver [place] of [address], [address] [place] [address], [address] [place] [address]   [phone]